# Patient Record
Sex: FEMALE | Race: WHITE | NOT HISPANIC OR LATINO
[De-identification: names, ages, dates, MRNs, and addresses within clinical notes are randomized per-mention and may not be internally consistent; named-entity substitution may affect disease eponyms.]

---

## 2021-06-24 ENCOUNTER — DASHBOARD ENCOUNTERS (OUTPATIENT)
Age: 21
End: 2021-06-24

## 2021-06-24 ENCOUNTER — OFFICE VISIT (OUTPATIENT)
Dept: URBAN - METROPOLITAN AREA CLINIC 94 | Facility: CLINIC | Age: 21
End: 2021-06-24
Payer: COMMERCIAL

## 2021-06-24 ENCOUNTER — WEB ENCOUNTER (OUTPATIENT)
Dept: URBAN - METROPOLITAN AREA CLINIC 94 | Facility: CLINIC | Age: 21
End: 2021-06-24

## 2021-06-24 VITALS
WEIGHT: 293 LBS | DIASTOLIC BLOOD PRESSURE: 82 MMHG | TEMPERATURE: 96.1 F | BODY MASS INDEX: 50.02 KG/M2 | HEIGHT: 64 IN | HEART RATE: 79 BPM | SYSTOLIC BLOOD PRESSURE: 138 MMHG

## 2021-06-24 DIAGNOSIS — D72.825 BANDEMIA: ICD-10-CM

## 2021-06-24 DIAGNOSIS — R07.89 COSTOCHONDRAL CHEST PAIN: ICD-10-CM

## 2021-06-24 DIAGNOSIS — R11.2 NON-INTRACTABLE VOMITING WITH NAUSEA, UNSPECIFIED VOMITING TYPE: ICD-10-CM

## 2021-06-24 DIAGNOSIS — E66.01 MORBID OBESITY: ICD-10-CM

## 2021-06-24 PROBLEM — 238136002: Status: ACTIVE | Noted: 2021-06-24

## 2021-06-24 PROBLEM — 442113000: Status: ACTIVE | Noted: 2021-06-24

## 2021-06-24 PROCEDURE — 99204 OFFICE O/P NEW MOD 45 MIN: CPT | Performed by: INTERNAL MEDICINE

## 2021-06-24 RX ORDER — TRAMADOL HYDROCHLORIDE 50 MG/1
1 TABLET AS NEEDED TABLET, FILM COATED ORAL ONCE A DAY
Status: ACTIVE | COMMUNITY

## 2021-06-24 RX ORDER — NORGESTIMATE AND ETHINYL ESTRADIOL 0.25-0.035
1 TABLET KIT ORAL ONCE A DAY
Status: ACTIVE | COMMUNITY

## 2021-06-24 NOTE — HPI-TODAY'S VISIT:
5/29/21-Awoke with NV. Then sharp pain L low anterior chest. No other GI symptoms. ER visit-WBC 15K.CT- A/P-NL. DX-No specific DX. RX-Tramadol Feels fine now. Minor HB.

## 2021-06-26 LAB
ALPHA 2-MACROGLOBULINS, QN: 233
ALT (SGPT) P5P: 18
APOLIPOPROTEIN A-1: 175
AST (SGOT) P5P: 15
BASO (ABSOLUTE): 0.1
BASOS: 1
BILIRUBIN, TOTAL: <0.1
C-REACTIVE PROTEIN, QUANT: 32
CHOLESTEROL, TOTAL: 183
COMMENT:: (no result)
EOS (ABSOLUTE): 0.3
EOS: 3
FIBROSIS SCORE: 0.02
FIBROSIS SCORING:: (no result)
FIBROSIS STAGE: (no result)
GGT: 13
GLUCOSE, SERUM: 76
HAPTOGLOBIN: 199
HEIGHT:: 64
HEMATOCRIT: 43.2
HEMATOLOGY COMMENTS:: (no result)
HEMOGLOBIN: 13.3
IMMATURE CELLS: (no result)
IMMATURE GRANS (ABS): 0
IMMATURE GRANULOCYTES: 0
INTERPRETATIONS:: (no result)
LIMITATIONS:: (no result)
LYMPHS (ABSOLUTE): 3.2
LYMPHS: 27
MCH: 24.7
MCHC: 30.8
MCV: 80
MONOCYTES(ABSOLUTE): 0.7
MONOCYTES: 6
NASH GRADE: (no result)
NASH SCORE: 0.25
NASH SCORING: (no result)
NEUTROPHILS (ABSOLUTE): 7.4
NEUTROPHILS: 63
NRBC: (no result)
PLATELETS: 404
RBC: 5.38
RDW: 16
STEATOSIS GRADE: (no result)
STEATOSIS GRADING: (no result)
STEATOSIS SCORE: 0.26
TRIGLYCERIDES: 191
WBC: 11.6
WEIGHT:: 325